# Patient Record
Sex: FEMALE | Race: WHITE | NOT HISPANIC OR LATINO | Employment: STUDENT | URBAN - METROPOLITAN AREA
[De-identification: names, ages, dates, MRNs, and addresses within clinical notes are randomized per-mention and may not be internally consistent; named-entity substitution may affect disease eponyms.]

---

## 2017-01-10 ENCOUNTER — OFFICE VISIT (OUTPATIENT)
Dept: NEUROLOGY | Facility: CLINIC | Age: 27
End: 2017-01-10
Payer: COMMERCIAL

## 2017-01-10 VITALS
SYSTOLIC BLOOD PRESSURE: 108 MMHG | HEIGHT: 63 IN | HEART RATE: 85 BPM | BODY MASS INDEX: 18.13 KG/M2 | WEIGHT: 102.31 LBS | DIASTOLIC BLOOD PRESSURE: 74 MMHG

## 2017-01-10 DIAGNOSIS — G43.909 MIGRAINE WITHOUT STATUS MIGRAINOSUS, NOT INTRACTABLE, UNSPECIFIED MIGRAINE TYPE: Primary | ICD-10-CM

## 2017-01-10 PROCEDURE — 99213 OFFICE O/P EST LOW 20 MIN: CPT | Mod: S$GLB,,, | Performed by: PHYSICIAN ASSISTANT

## 2017-01-10 PROCEDURE — 99999 PR PBB SHADOW E&M-EST. PATIENT-LVL III: CPT | Mod: PBBFAC,,, | Performed by: PHYSICIAN ASSISTANT

## 2017-01-10 PROCEDURE — 1159F MED LIST DOCD IN RCRD: CPT | Mod: S$GLB,,, | Performed by: PHYSICIAN ASSISTANT

## 2017-01-10 RX ORDER — PREDNISOLONE ACETATE 10 MG/ML
SUSPENSION/ DROPS OPHTHALMIC
Refills: 0 | COMMUNITY
Start: 2016-12-21

## 2017-01-10 NOTE — PROGRESS NOTES
[facility], Department of Neurology   [facility] [place] [address]  Phone:[phone]  Fax: [phone]      Established Patient       Patient Name: Geraldine Knutson  : 1990  MRN:  [id number]    1/10/2017  Roderick Barajas MPA, PAGabrielC    PCP:  Nery Banuelos MD  Date of Last Visit: 2016    CC: Follow-up      IMPRESSION:       ICD-10-CM ICD-9-CM   1. Migraine without status migrainosus, not intractable, unspecified migraine type G43.909 346.90         PLAN:     To reduce HA:  Continue topamax at 25 mg (discussed I haven't seen this cause acne, if she feels her acne is worsening, we can certainly come off of the medicine)    To abort a HA:  Continue maxalt    Health maintenance: gave her name of dermatologist in [place]. Suggested she use dove soap\    I set her up with the virtual migraine clinic, discussed this process at length with her      Review:   Orders Placed This Encounter    Virtual Migraine Questionnaire Series     No orders of the defined types were placed in this encounter.      The patient is to continue to follow with the PCP for all other health conditions.    The patient indicates understanding of these issues and agrees to the plan    All current medications, test results as well as any necessary instructions were discussed with Geraldine Knutson. Side effects of medications were explained. The patient indicates understanding of these issues and agrees to the plan and/or medication (s) discussed.      Follow Up at her request one more time before I depart the practice, she asks to be seen in 2017.         INTERVAL HISTORY:      History of Present Illness:  Geraldine Knutson is a 26 y.o. right handed, female. She presents accompanied by her mother for follow up. She  states:    10 HD a month (down from 30/30 HD), lasting 4 hours at time (down from all day), 3-4/10, no longer getting nausea with the headaches, hasn't had to take maxalt, steroids did help.  "Last HA within the past 2 days. States new development, more acne, she is concerned about topamax causing this issue (?). States no comedones except facie (was along frontalis and jaw line, getting better, now along frontalis).     Historically:  Severity: range: 3-8/10  Duration:all day  Frequency:30/30 HD a month with 4/30 being migraine ("the 8/10 pain with photophobia, nausea, diplopia, eye pain, etc").   Associated factors (bolded positive) WITH HA: Nausea, vomiting, photophobia, phonophobia, tinnitus, scalp pain, vision loss, diplopia, scintillations, eye pain, jaw pain, weakness?   Imaging on file: MRI brain 2014 reviewed   Therapies tried in past:  Elavil: helped and stopped taking it (?)  Gabapentin: states she has not taken it (chart review states otherwise)  Lyrica: made too tired  fioricet  Toradol: ineffective (oral)  Medrol dose back: ineffective  Imitrex:ineffective, states she took 1/2 a pill (25 mg?)   Topmax, deltasone, maxalt written today    Medication reviewed and documented below:  Current Outpatient Prescriptions   Medication Sig Dispense Refill    CYANOCOBALAMIN, VITAMIN B-12, (B-12 KIT INJ) Inject as directed.      GENERESS FE 0.8mg-25mcg(24) & 75 mg (4) Chew Take 1 tablet by mouth Daily.      ibuprofen (ADVIL,MOTRIN) 100 MG tablet Take 100 mg by mouth every 6 (six) hours as needed for Temperature greater than.      ketoconazole (NIZORAL) 2 % shampoo Wash hair with medicated shampoo at least 2x/week - let sit on scalp at least 5 minutes prior to rinsing 120 mL 5    rizatriptan (MAXALT) 10 MG tablet 1 pill at onset headache, second pill 2 hours later, no more than 2 pills day/3 days use in week 12 tablet 3    topiramate (TOPAMAX) 25 MG tablet Around bedtime every night: week 1: 1 pill, week 2: 2 pills, week 3: 3 pills, week 4: 4pills 120 tablet 6    UNABLE TO FIND medication name:cefaly + electrodes, wear 20-30 mins, Dx: migraines, [email or web address] 1 Units 0     No current " "facility-administered medications for this visit.      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     Social History Main Topics    Smoking status: Never Smoker    Smokeless tobacco: Not on file    Alcohol use No    Drug use: No    Sexual activity: No     Other Topics Concern    Not on file     Social History Narrative          Since Last visit, no further changes in PMHx, FHx, SHx if not already listed above.      Review of Testing:  Reviewed in chart, nothing to add to today's visit          Review Of Systems (questions asked, positive or additions in BOLD)  Gen: fatigue   HEENT: Tinnitus, headache, blurred vision, eye pain, diplopia, photophobia   Card: Palpitations, CP   Pulm: SOB, cough         GI: N/V/D/C, hematemesis, incontinence, hematochezia              M/S: Neck pain, myalgia, falls, back pain, joint pain   Neuro: PER HPI              PHYSICAL EXAM:      Visit Vitals    /74    Pulse 85    Ht 5' 3" (1.6 m)    Wt 46.4 kg (102 lb 4.7 oz)    BMI 18.12 kg/m2    GEN: NAD, lights on in exam room   HEENT: NC/AT, Frontalis was NTTP  DERM:comedones present along frontalis   NEURO:  Mental Status: Awake, alert and appropriately oriented to time, place, and person. Normal attention and concentration. Speech is fluent and " appropriate language function     Cranial Nerves:  Extraocular movements are intact and without nystagmus. Visual fields are full to confrontation testing. Facial movement is symmetric. Facial sensation is intact. Hearing is normal. Uvula in midline. Shoulder shrug symmetrical. Tongue in midline without fasiculation.      Motor:  RUE:appropriate against gravity and medium force as tested 5/5  LUE: appropriate against gravity and medium force as tested 5/5  RLE:appropriate against gravity and medium force as tested 5/5  LLE: appropriate against gravity and medium force as tested 5/5  No resting tremor      DTR's  1 + bilat patella          Gait and Stance: Normal manner of stance and gait function testing.     CC:  Nery Banuelos MD      This document has been electronically signed by Roderick Barajas MPA, PA-C on 1/10/2017, 1:36 PM.  I have personally typed this message using the EMR.      Attending, Dr. Elida MD was available during today's encounter. Any change to plan along with cosign to appear in the EMR.

## 2017-01-10 NOTE — MR AVS SNAPSHOT
[facility] - Neurology  [address]  [place] [address]  Phone: [phone]  Fax: [phone]                  Geraldine Knutson   1/10/2017 2:00 PM   Office Visit    Description:  Female : 1990   Provider:  Roderick Barajas PA-C   Department:  [facility] - Neurology           Reason for Visit     Follow-up           Diagnoses this Visit        Comments    Migraine without status migrainosus, not intractable, unspecified migraine type    -  Primary            To Do List           Future Appointments        Provider Department Dept Phone    2017 7:30 AM NESSA Sneed  Neurology [phone]      Goals (5 Years of Data)     None      [facility]     [facility] -  Assistance  Registered nurses in the [facility] provide clinical advisement, health education, appointment booking, and other advisory services.  Call for this free service at [phone].             Medications           Message regarding Medications     Verify the changes and/or additions to your medication regime listed below are the same as discussed with your clinician today.  If any of these changes or additions are incorrect, please notify your healthcare provider.             Verify that the below list of medications is an accurate representation of the medications you are currently taking.  If none reported, the list may be blank. If incorrect, please contact your healthcare provider. Carry this list with you in case of emergency.           Current Medications     CYANOCOBALAMIN, VITAMIN B-12, (B-12 KIT INJ) Inject as directed.    GENERESS FE 0.8mg-25mcg(24) & 75 mg (4) Chew Take 1 tablet by mouth Daily.    ibuprofen (ADVIL,MOTRIN) 100 MG tablet Take 100 mg by mouth every 6 (six) hours as needed for Temperature greater than.    ketoconazole (NIZORAL) 2 % shampoo Wash hair with medicated shampoo at least 2x/week - let sit on scalp at least 5 minutes prior to  "rinsing    prednisoLONE acetate (PRED FORTE) 1 % DrpS     rizatriptan (MAXALT) 10 MG tablet 1 pill at onset headache, second pill 2 hours later, no more than 2 pills day/3 days use in week    topiramate (TOPAMAX) 25 MG tablet Around bedtime every night: week 1: 1 pill, week 2: 2 pills, week 3: 3 pills, week 4: 4pills    UNABLE TO FIND medication name:cefaly + electrodes, wear 20-30 mins, Dx: migraines, [email or web address]           Clinical Reference Information           Vital Signs - Last Recorded  Most recent update: 1/10/2017  1:39 PM by Ramona Soliman MA    BP Pulse Ht Wt BMI    108/74 85 5' 3" (1.6 m) 46.4 kg (102 lb 4.7 oz) 18.12 kg/m2      Blood Pressure          Most Recent Value    BP  108/74      Allergies as of 1/10/2017     Biotin      Immunizations Administered on Date of Encounter - 1/10/2017     None      Orders Placed During Today's Visit      Normal Orders This Visit    Virtual Migraine Questionnaire Series       "

## 2017-01-12 ENCOUNTER — PATIENT MESSAGE (OUTPATIENT)
Dept: ADMINISTRATIVE | Facility: OTHER | Age: 27
End: 2017-01-12

## 2017-08-17 ENCOUNTER — TELEPHONE (OUTPATIENT)
Dept: INTERNAL MEDICINE | Facility: CLINIC | Age: 27
End: 2017-08-17

## 2017-08-17 NOTE — TELEPHONE ENCOUNTER
----- Message from Kari Suarez sent at 8/17/2017  9:31 AM CDT -----  Contact: Riddhi/ Mother/ [phone]   Riddhi stated the pt is not feeling well. She stated she take her to other doctor and she is not getting any better. She want to speak with someone in the office to let them know what is going on. Please call and advise     Thank you

## 2017-08-17 NOTE — TELEPHONE ENCOUNTER
Pt mom states her daughter wasn't feeling well her whit blood count was at 15 she repeated the test it went down to 7.5. She has been having fever , no energy , she feeling bad. The physician in [place].

## 2023-09-12 ENCOUNTER — TELEPHONE (OUTPATIENT)
Dept: INTERNAL MEDICINE | Facility: CLINIC | Age: 33
End: 2023-09-12
Payer: COMMERCIAL

## 2023-09-12 NOTE — TELEPHONE ENCOUNTER
----- Message from Lyudmila GRACE Zamarripa sent at 9/12/2023  1:43 PM CDT -----  Contact: [phone] or [phone]  Patient called, would like to know if Dr Fournier can accept her as her patient, patient stated that she was referred by patient parents Brandyn and Sonu Knutson (patient aware that Dr Fournier if not accepting new patients). Please call and advise. Thank you.

## 2023-10-04 ENCOUNTER — PATIENT MESSAGE (OUTPATIENT)
Dept: ADMINISTRATIVE | Facility: OTHER | Age: 33
End: 2023-10-04
Payer: COMMERCIAL

## 2023-10-10 ENCOUNTER — OFFICE VISIT (OUTPATIENT)
Dept: INTERNAL MEDICINE | Facility: CLINIC | Age: 33
End: 2023-10-10
Payer: COMMERCIAL

## 2023-10-10 VITALS
HEIGHT: 63 IN | BODY MASS INDEX: 19.96 KG/M2 | DIASTOLIC BLOOD PRESSURE: 72 MMHG | WEIGHT: 112.63 LBS | OXYGEN SATURATION: 98 % | SYSTOLIC BLOOD PRESSURE: 118 MMHG | HEART RATE: 67 BPM

## 2023-10-10 DIAGNOSIS — L65.9 HAIR LOSS DISORDER: Primary | ICD-10-CM

## 2023-10-10 PROCEDURE — 99213 PR OFFICE/OUTPT VISIT, EST, LEVL III, 20-29 MIN: ICD-10-PCS | Mod: S$GLB,,, | Performed by: INTERNAL MEDICINE

## 2023-10-10 PROCEDURE — 1159F MED LIST DOCD IN RCRD: CPT | Mod: CPTII,S$GLB,, | Performed by: INTERNAL MEDICINE

## 2023-10-10 PROCEDURE — 99999 PR PBB SHADOW E&M-EST. PATIENT-LVL V: CPT | Mod: PBBFAC,,, | Performed by: INTERNAL MEDICINE

## 2023-10-10 PROCEDURE — 99213 OFFICE O/P EST LOW 20 MIN: CPT | Mod: S$GLB,,, | Performed by: INTERNAL MEDICINE

## 2023-10-10 PROCEDURE — 1159F PR MEDICATION LIST DOCUMENTED IN MEDICAL RECORD: ICD-10-PCS | Mod: CPTII,S$GLB,, | Performed by: INTERNAL MEDICINE

## 2023-10-10 PROCEDURE — 3074F SYST BP LT 130 MM HG: CPT | Mod: CPTII,S$GLB,, | Performed by: INTERNAL MEDICINE

## 2023-10-10 PROCEDURE — 3078F PR MOST RECENT DIASTOLIC BLOOD PRESSURE < 80 MM HG: ICD-10-PCS | Mod: CPTII,S$GLB,, | Performed by: INTERNAL MEDICINE

## 2023-10-10 PROCEDURE — 3074F PR MOST RECENT SYSTOLIC BLOOD PRESSURE < 130 MM HG: ICD-10-PCS | Mod: CPTII,S$GLB,, | Performed by: INTERNAL MEDICINE

## 2023-10-10 PROCEDURE — 3078F DIAST BP <80 MM HG: CPT | Mod: CPTII,S$GLB,, | Performed by: INTERNAL MEDICINE

## 2023-10-10 PROCEDURE — 99999 PR PBB SHADOW E&M-EST. PATIENT-LVL V: ICD-10-PCS | Mod: PBBFAC,,, | Performed by: INTERNAL MEDICINE

## 2023-10-10 PROCEDURE — 3008F PR BODY MASS INDEX (BMI) DOCUMENTED: ICD-10-PCS | Mod: CPTII,S$GLB,, | Performed by: INTERNAL MEDICINE

## 2023-10-10 PROCEDURE — 3008F BODY MASS INDEX DOCD: CPT | Mod: CPTII,S$GLB,, | Performed by: INTERNAL MEDICINE

## 2023-10-10 RX ORDER — LIOTHYRONINE SODIUM 5 UG/1
5 TABLET ORAL DAILY
Qty: 30 TABLET | Refills: 11
Start: 2023-10-10 | End: 2024-10-09

## 2023-10-10 NOTE — PROGRESS NOTES
Subjective:       Patient ID: Geraldine Knutson is a 33 y.o. female.    Chief Complaint: Annual Exam    Sagrario is here for evaluation - she was seeing a NP at a pharmacy for hair loss.  They had her on testosterone she is worried about her testosterone going up and down - she brings in lab work. She has fatigue.  No CP or SOB.  Review of Systems   Respiratory:  Negative for shortness of breath (PND or orthopnea).    Cardiovascular:  Negative for chest pain (arm pain or jaw pain).   Gastrointestinal:  Negative for abdominal pain, diarrhea, nausea and vomiting.   Genitourinary:  Negative for dysuria.   Neurological:  Negative for seizures, syncope and headaches.       Objective:      Physical Exam  Constitutional:       General: She is not in acute distress.     Appearance: She is well-developed.   HENT:      Head: Normocephalic.   Eyes:      Pupils: Pupils are equal, round, and reactive to light.   Neck:      Thyroid: No thyromegaly.      Vascular: No JVD.   Cardiovascular:      Rate and Rhythm: Normal rate and regular rhythm.      Heart sounds: Normal heart sounds. No murmur heard.     No friction rub. No gallop.   Pulmonary:      Effort: Pulmonary effort is normal.      Breath sounds: Normal breath sounds. No wheezing or rales.   Abdominal:      General: Bowel sounds are normal. There is no distension.      Palpations: Abdomen is soft. There is no mass.      Tenderness: There is no abdominal tenderness. There is no guarding or rebound.   Musculoskeletal:      Cervical back: Neck supple.   Lymphadenopathy:      Cervical: No cervical adenopathy.   Skin:     General: Skin is warm and dry.   Neurological:      Mental Status: She is alert and oriented to person, place, and time.      Deep Tendon Reflexes: Reflexes are normal and symmetric.   Psychiatric:         Behavior: Behavior normal.         Thought Content: Thought content normal.         Judgment: Judgment normal.         Assessment:       1. Hair loss disorder         Plan:   Hair loss disorder  -     Ambulatory referral/consult to Endocrinology; Future; Expected date: 10/17/2023  -     Ambulatory referral/consult to Dermatology; Future; Expected date: 10/17/2023    Other orders  -     liothyronine (CYTOMEL) 5 MCG Tab; Take 1 tablet (5 mcg total) by mouth once daily.  Dispense: 30 tablet; Refill: 11    Refill thyroid med - last TSH was WNL

## 2023-10-11 ENCOUNTER — TELEPHONE (OUTPATIENT)
Dept: INTERNAL MEDICINE | Facility: CLINIC | Age: 33
End: 2023-10-11
Payer: COMMERCIAL

## 2023-10-11 NOTE — TELEPHONE ENCOUNTER
----- Message from Lyudmila Zamarripa sent at 10/11/2023 11:31 AM CDT -----  Contact: [phone]  Patient called, requested a courtesy call from medical assistant  in regards the referral  for endocrinology - did not provide more information. Please call and advise. Thank you

## 2023-10-16 ENCOUNTER — TELEPHONE (OUTPATIENT)
Dept: INTERNAL MEDICINE | Facility: CLINIC | Age: 33
End: 2023-10-16

## 2023-10-16 DIAGNOSIS — R79.89 ABNORMAL SERUM TESTOSTERONE LEVEL: Primary | ICD-10-CM

## 2024-01-05 ENCOUNTER — OFFICE VISIT (OUTPATIENT)
Dept: DERMATOLOGY | Facility: CLINIC | Age: 34
End: 2024-01-05
Payer: COMMERCIAL

## 2024-01-05 VITALS — WEIGHT: 112.63 LBS | BODY MASS INDEX: 19.96 KG/M2 | HEIGHT: 63 IN

## 2024-01-05 DIAGNOSIS — L65.9 ALOPECIA: ICD-10-CM

## 2024-01-05 PROCEDURE — 3008F BODY MASS INDEX DOCD: CPT | Mod: CPTII,S$GLB,, | Performed by: STUDENT IN AN ORGANIZED HEALTH CARE EDUCATION/TRAINING PROGRAM

## 2024-01-05 PROCEDURE — 1159F MED LIST DOCD IN RCRD: CPT | Mod: CPTII,S$GLB,, | Performed by: STUDENT IN AN ORGANIZED HEALTH CARE EDUCATION/TRAINING PROGRAM

## 2024-01-05 PROCEDURE — 11105 PUNCH BX SKIN EA SEP/ADDL: CPT | Mod: S$GLB,,, | Performed by: STUDENT IN AN ORGANIZED HEALTH CARE EDUCATION/TRAINING PROGRAM

## 2024-01-05 PROCEDURE — 88305 TISSUE EXAM BY PATHOLOGIST: CPT | Mod: 59 | Performed by: PATHOLOGY

## 2024-01-05 PROCEDURE — 1160F RVW MEDS BY RX/DR IN RCRD: CPT | Mod: CPTII,S$GLB,, | Performed by: STUDENT IN AN ORGANIZED HEALTH CARE EDUCATION/TRAINING PROGRAM

## 2024-01-05 PROCEDURE — 11104 PUNCH BX SKIN SINGLE LESION: CPT | Mod: S$GLB,,, | Performed by: STUDENT IN AN ORGANIZED HEALTH CARE EDUCATION/TRAINING PROGRAM

## 2024-01-05 PROCEDURE — 99999 PR PBB SHADOW E&M-EST. PATIENT-LVL V: CPT | Mod: PBBFAC,,, | Performed by: STUDENT IN AN ORGANIZED HEALTH CARE EDUCATION/TRAINING PROGRAM

## 2024-01-05 PROCEDURE — 99214 OFFICE O/P EST MOD 30 MIN: CPT | Mod: 25,S$GLB,, | Performed by: STUDENT IN AN ORGANIZED HEALTH CARE EDUCATION/TRAINING PROGRAM

## 2024-01-05 PROCEDURE — 88305 TISSUE EXAM BY PATHOLOGIST: CPT | Mod: 26,,, | Performed by: PATHOLOGY

## 2024-01-05 RX ORDER — SPIRONOLACTONE 25 MG/1
25 TABLET ORAL DAILY
Qty: 90 TABLET | Refills: 3 | Status: SHIPPED | OUTPATIENT
Start: 2024-01-05 | End: 2025-01-04

## 2024-01-05 RX ORDER — MINOXIDIL 2.5 MG/1
2.5 TABLET ORAL DAILY
Qty: 90 TABLET | Refills: 3 | Status: SHIPPED | OUTPATIENT
Start: 2024-01-05 | End: 2025-01-04

## 2024-01-05 NOTE — LETTER
January 5, 2024      [facility] [address] [address]  [address] [place] [address]  Phone: [phone]  Fax: [phone]       Patient: Sonu Knutson   YOB: 1990  Date of Visit: 01/05/2024    To Whom It May Concern:    Sue Knutson  was at [facility] on 01/05/2024. The patient may return to work/school on 01/08/24 with no restrictions. If you have any questions or concerns, or if I can be of further assistance, please do not hesitate to contact me.    Sincerely,    Fela Blackman MA

## 2024-01-05 NOTE — PROGRESS NOTES
Patient Information  Name: Geraldine Knutson  : 1990  MRN: [id number]     Referring Physician:  Dr. Banuelos   Primary Care Physician:  Nery Gatica MD   Date of Visit: 2024      Subjective:       Geraldine Knutson is a 33 y.o. female who presents for   Chief Complaint   Patient presents with    Hair/Scalp Problem     Pt visit for hair loss        Patient with new complaint of lesion(s)  Location: scalp  Duration: years  Symptoms: hair shedding and breaking  Relieving factors/Previous treatments: biotin, nutrafol    Reports worsening after covid. +family hx of thyroid. +family hx of hair loss. Had cholecystectomy 2 years ago but hair loss started before then. Denies any perms or relaxers.    Patient was last seen:Visit date not found     Prior notes by myself reviewed.   Clinical documentation obtained by nursing staff reviewed.    Review of Systems   Skin:  Negative for itching and rash.        Objective:    Physical Exam   Constitutional: She appears well-developed and well-nourished. No distress.   Neurological: She is alert and oriented to person, place, and time. She is not disoriented.   Psychiatric: She has a normal mood and affect.   Skin:   Areas Examined (abnormalities noted in diagram):   Scalp / Hair Palpated and Inspected              Diagram Legend     Erythematous scaling macule/papule c/w actinic keratosis       Vascular papule c/w angioma      Pigmented verrucoid papule/plaque c/w seborrheic keratosis      Yellow umbilicated papule c/w sebaceous hyperplasia      Irregularly shaped tan macule c/w lentigo     1-2 mm smooth white papules consistent with Milia      Movable subcutaneous cyst with punctum c/w epidermal inclusion cyst      Subcutaneous movable cyst c/w pilar cyst      Firm pink to brown papule c/w dermatofibroma      Pedunculated fleshy papule(s) c/w skin tag(s)      Evenly pigmented macule c/w junctional nevus     Mildly variegated pigmented, slightly irregular-bordered  macule c/w mildly atypical nevus      Flesh colored to evenly pigmented papule c/w intradermal nevus       Pink pearly papule/plaque c/w basal cell carcinoma      Erythematous hyperkeratotic cursted plaque c/w SCC      Surgical scar with no sign of skin cancer recurrence      Open and closed comedones      Inflammatory papules and pustules      Verrucoid papule consistent consistent with wart     Erythematous eczematous patches and plaques     Dystrophic onycholytic nail with subungual debris c/w onychomycosis     Umbilicated papule    Erythematous-base heme-crusted tan verrucoid plaque consistent with inflamed seborrheic keratosis     Erythematous Silvery Scaling Plaque c/w Psoriasis     See annotation          [] Data reviewed  [] Independent review of test  [] Management discussed with another provider    Assessment / Plan:      Pathology Orders:       Normal Orders This Visit    Specimen to Pathology, Dermatology     Comments:    ATTN: DR. GOYAL  Number of Specimens:->2  ------------------------->-------------------------  Spec 1 Procedure:->Biopsy  please section vertically  Spec 1 Clinical Impression:->telogen effluvium vs female  pattern hair loss  Spec 1 Source:->scalp vertical  ------------------------->-------------------------  Spec 2 Procedure:->Biopsy  please section horizontally  Spec 2 Clinical Impression:->telogen effluvium vs female  pattern hair loss  Spec 2 Source:->scalp horizontal  Release to patient->Immediate    Questions:    Procedure Type: Dermatology and skin neoplasms    Number of Specimens: 2    ------------------------: -------------------------    Spec 1 Procedure: Biopsy Comment - please section vertically    Spec 1 Clinical Impression: telogen effluvium vs female pattern hair loss    Spec 1 Source: scalp vertical    ------------------------: -------------------------    Spec 2 Procedure: Biopsy Comment - please section horizontally    Spec 2 Clinical Impression: telogen effluvium  vs female pattern hair loss    Spec 2 Source: scalp horizontal    Release to patient: Immediate          Alopecia  -     Specimen to Pathology, Dermatology  -     minoxidiL (LONITEN) 2.5 MG tablet; Take 1 tablet (2.5 mg total) by mouth once daily.  Dispense: 90 tablet; Refill: 3  -     spironolactone (ALDACTONE) 25 MG tablet; Take 1 tablet (25 mg total) by mouth once daily.  Dispense: 90 tablet; Refill: 3  -     ORI Screen w/Reflex; Future; Expected date: 01/05/2024  -     Vitamin D; Future; Expected date: 01/06/2024  Discussed side affects of  minoxidil:  Side effects that you should report to your doctor or health care professional as soon as possible:  chest pain, fast or irregular heartbeat, palpitations  difficulty breathing  dizziness or fainting spells  redness, blistering, peeling or loosening of the skin, including inside the mouth  skin rash or itching  stiff or swollen joints  sudden weight gain  swelling of the feet or legs  unusual weakness  Side effects that usually do not require medical attention (report to your doctor or health care professional if they continue or are bothersome):  headache  unusual hair growth, on the face, arm, and back    Discussed benefits and risks of therapy including but not limited to breakthrough bleeding/menstrual irregularities, breast tenderness/enlargement, and elevated potassium levels which may give symptoms of fatigue, palpitations, dizziness, headaches and nausea. Patient should limit potassium intake - avoid potassium supplements or salt substitutes, limit bananas and citrus fruits. Pregnancy must be avoided while taking spironolactone.             LOS NUMBER AND COMPLEXITY OF PROBLEMS    COMPLEXITY OF DATA RISK TOTAL TIME (m)   73009  30791 [] 1 self-limited or minor problem [] Minimal to none [] No treatment recommended or patient to monitor 15-29  10-19   50537  75162 Low  [] 2 or > self limited or minor problems  [] 1 stable chronic illness  [] 1 acute,  uncomplicated illness or injury Limited (2)  [] Prior external notes from each unique source  [] Review result of each unique test  [] Order each unique test []  Low  OTC medications, minor skin biopsy 30-44  20-29   67671  91650 Moderate  [x]  1 or > chronic illness with progression, exacerbation or SE of treatment  []  2 or more stable chronic illnesses  []  1 acute illness with systemic symptoms  []  1 acute complicated injury  []  1 undiagnosed new problem with uncertain prognosis Moderate (1/3 below)  [x]  3 or more data items        *Now includes assessment requiring independent historian  []  Independent interpretation of a test  []  Discuss management/test with another provider Moderate  [x]  Prescription drug mgmt  []  Minor surgery with risk discussed  []  Mgmt limited by social determinates 45-59  30-39   80952  40043 High  []  1 or more chronic illness with severe exacerbation, progression or SE of treatment  []  1 acute or chronic illness/injury that poses a threat to life or bodily function Extensive (2/3 below)  []  3 or more data items        *Now includes assessment requiring independent historian.  []  Independent interpretation of a test  []  Discuss management/test with another provider High  []  Major surgery with risk discussed  []  Drug therapy requiring intensive monitoring for toxicity  []  Hospitalization  []  Decision for DNR 60-74  40-54      No follow-ups on file.    Thu Dutta MD, FAAD  [facility]

## 2024-01-05 NOTE — PATIENT INSTRUCTIONS
Viviscal Professional Hair Growth Supplement  Cost: $43 for 60 tablets  Ingredients    Vitamin C (from Acerola Powder and as Ascorbic Acid), Biotin, AminoMar Marine Complex, Shark Powder, Mollusc Powder, Apple Extract Powder, Procyanidin B-2, L-Cystine, L-Methionine, Microcrystalline Cellulose, Maltodextrin, Hydroxypropyl Methyl Cellulose, Magnesium Stearate, Silicon Dioxide, Artificial Orange Flavoring, Modified Starch, Glycerol.    Allergy advice: Contains fish and shellfish, not recommended for those allergic to fish, shellfish, or seafood.    Direction for use  Recommended daily intake: 2 tablets  Use daily for a minimum of 3-6 months  Take 1 tablet in the morning and 1 tablet in the evening (with water, after eating)  Afterwards take 1-2 tablets daily as required to maintain healthy hair growth

## 2024-01-05 NOTE — LETTER
January 5, 2024      [facility] [address]  [address] [place] [address]  Phone: [phone]  Fax: [phone]       Patient: Geraldine Knutson   YOB: 1990  Date of Visit: 01/05/2024    To Whom It May Concern:    Anson Knutson  was at [facility] on 01/05/2024. The patient may return to work/school on 01/06/23 with no restrictions. If you have any questions or concerns, or if I can be of further assistance, please do not hesitate to contact me.    Sincerely,    Fela Blackman MA

## 2024-01-05 NOTE — LETTER
January 5, 2024      [facility] [address]  [address] [place] [address]  Phone: [phone]  Fax: [phone]       Patient: Geraldine Knutson   YOB: 1990  Date of Visit: 01/05/2024    To Whom It May Concern:    Anson Knutson  was at [facility] on 01/05/2024. The patient may return to work/school on 01/06/24 with no restrictions. If you have any questions or concerns, or if I can be of further assistance, please do not hesitate to contact me.    Sincerely,    Fela Blackman MA

## 2024-01-11 ENCOUNTER — PATIENT MESSAGE (OUTPATIENT)
Dept: DERMATOLOGY | Facility: CLINIC | Age: 34
End: 2024-01-11
Payer: COMMERCIAL

## 2024-01-22 LAB
FINAL PATHOLOGIC DIAGNOSIS: NORMAL
GROSS: NORMAL
Lab: NORMAL

## 2024-01-23 DIAGNOSIS — L73.8 PITYROSPORUM FOLLICULITIS: Primary | ICD-10-CM

## 2024-01-23 DIAGNOSIS — L65.9 HAIR THINNING: ICD-10-CM

## 2024-01-23 RX ORDER — FLUCONAZOLE 150 MG/1
150 TABLET ORAL DAILY
Qty: 14 TABLET | Refills: 0 | Status: SHIPPED | OUTPATIENT
Start: 2024-01-23 | End: 2024-02-06

## 2024-01-23 RX ORDER — KETOCONAZOLE 20 MG/ML
SHAMPOO, SUSPENSION TOPICAL
Qty: 120 ML | Refills: 5 | Status: SHIPPED | OUTPATIENT
Start: 2024-01-23